# Patient Record
Sex: FEMALE | Race: OTHER | HISPANIC OR LATINO | URBAN - METROPOLITAN AREA
[De-identification: names, ages, dates, MRNs, and addresses within clinical notes are randomized per-mention and may not be internally consistent; named-entity substitution may affect disease eponyms.]

---

## 2024-05-16 ENCOUNTER — EMERGENCY (EMERGENCY)
Facility: HOSPITAL | Age: 34
LOS: 1 days | Discharge: HOME | End: 2024-05-16
Attending: EMERGENCY MEDICINE
Payer: COMMERCIAL

## 2024-05-16 VITALS
HEART RATE: 83 BPM | DIASTOLIC BLOOD PRESSURE: 74 MMHG | SYSTOLIC BLOOD PRESSURE: 113 MMHG | WEIGHT: 191.8 LBS | OXYGEN SATURATION: 99 % | HEIGHT: 69 IN | RESPIRATION RATE: 18 BRPM | TEMPERATURE: 99 F

## 2024-05-16 PROCEDURE — 93010 ELECTROCARDIOGRAM REPORT: CPT

## 2024-05-16 PROCEDURE — 93005 ELECTROCARDIOGRAM TRACING: CPT

## 2024-05-16 PROCEDURE — 99283 EMERGENCY DEPT VISIT LOW MDM: CPT | Mod: 25

## 2024-05-16 PROCEDURE — 99284 EMERGENCY DEPT VISIT MOD MDM: CPT

## 2024-05-16 RX ORDER — FAMOTIDINE 10 MG/ML
20 INJECTION INTRAVENOUS ONCE
Refills: 0 | Status: COMPLETED | OUTPATIENT
Start: 2024-05-16 | End: 2024-05-16

## 2024-05-16 RX ADMIN — Medication 30 MILLILITER(S): at 17:22

## 2024-05-16 RX ADMIN — FAMOTIDINE 20 MILLIGRAM(S): 10 INJECTION INTRAVENOUS at 17:22

## 2024-05-16 NOTE — ED PROVIDER NOTE - OBJECTIVE STATEMENT
33 yr old female with hx of h pylori treated 2 yrs ago presents to ed c/o non-radiating epigastric burning and sharp pain on and off since told has h pylori but last night worse with nausea and diarrhea. always have pain with greasy food, alcohol or spicy. worse at night. pt occasionally takes omeprazole. no nsaid use, no weigh loss, no vomiting. never seen GI. tolerated po today and no diarrhea.

## 2024-05-16 NOTE — ED PROVIDER NOTE - GASTROINTESTINAL, MLM
Anticoagulation Annual Referral Renewal Review    Prosper Lopez's chart reviewed for annual renewal of referral to anticoagulation monitoring.        Criteria for anticoagulation nurse and/or pharmacist renewal met   Warfarin indication: Atrial Fibrillation Yes, per indication   Current with INR monitoring/compliant Yes Yes   Date of last office visit 10/13/20 Yes, had office visit within last year   Time in Therapeutic Range (TTR) 96 % Yes, TTR > 60%       Prosper Lopez met all criteria for anticoagulation management program initiated renewal.  New INR standing orders and anticoagulation referral renewal placed.      Dayanara Lambert RN  2:19 PM         Abdomen soft, epigastric-tender, no guarding. neg starkey and [place] point

## 2024-05-16 NOTE — ED PROVIDER NOTE - PATIENT PORTAL LINK FT
You can access the FollowMyHealth Patient Portal offered by [facility] by registering at the following website: [email or web address]. By joining [facility]’s FollowMyHealth portal, you will also be able to view your health information using other applications (apps) compatible with our system.

## 2024-05-16 NOTE — ED PROVIDER NOTE - NSFOLLOWUPINSTRUCTIONS_ED_ALL_ED_FT
Dx gastritis.   pepcid 20mg 2x/day daily. mylanta 30ml as needed for gastritis 4x/day.  avoid spicy, oily, hot foods, NSAIDs, alcohol. see gastroenterologist

## 2024-05-16 NOTE — ED PROVIDER NOTE - NSFOLLOWUPCLINICS_GEN_ALL_ED_FT
[facility]  Gastroenterology  [address] [place] [address]  Phone: [phone]  Fax: [phone]

## 2024-05-16 NOTE — ED ADULT NURSE NOTE - NSFALLUNIVINTERV_ED_ALL_ED
Bed/Stretcher in lowest position, wheels locked, appropriate side rails in place/Call bell, personal items and telephone in reach/Instruct patient to call for assistance before getting out of bed/chair/stretcher/Non-slip footwear applied when patient is off stretcher/[place] to call system/Physically safe environment - no spills, clutter or unnecessary equipment/Purposeful proactive rounding/Room/bathroom lighting operational, light cord in reach

## 2024-05-16 NOTE — ED PROVIDER NOTE - CLINICAL SUMMARY MEDICAL DECISION MAKING FREE TEXT BOX
33 yr old female with hx of h pylori treated 2 yrs ago presents to ed c/o non-radiating epigastric burning and sharp pain on and off since told has h pylori but last night worse with nausea and diarrhea. always have pain with greasy food, alcohol or spicy. worse at night. pt occasionally takes omeprazole. no nsaid use, no weigh loss, no vomiting. never seen GI. tolerated po today and no diarrhea.    bedside sono shows no GB stone, normal wall, no pericystic fluid. no pain. liver homogenous. pt exam and history consistent with gastritis. tolerating po and not radiating to back- doubtful pancreatitis. no sign of GIB